# Patient Record
Sex: FEMALE | Race: WHITE | Employment: FULL TIME | URBAN - METROPOLITAN AREA
[De-identification: names, ages, dates, MRNs, and addresses within clinical notes are randomized per-mention and may not be internally consistent; named-entity substitution may affect disease eponyms.]

---

## 2018-08-23 ENCOUNTER — ACNE/ROSACEA (OUTPATIENT)
Dept: URBAN - METROPOLITAN AREA CLINIC 36 | Facility: CLINIC | Age: 28
Setting detail: DERMATOLOGY
End: 2018-08-23

## 2018-08-23 PROBLEM — D22.39 MELANOCYTIC NEVI OF OTHER PARTS OF FACE: Status: RESOLVED | Noted: 2018-08-23

## 2018-08-23 PROCEDURE — 99202 OFFICE O/P NEW SF 15 MIN: CPT

## 2018-08-23 RX ORDER — SPIRONOLACTONE 100 MG/1
1 TABLET TABLET, FILM COATED ORAL BID
Qty: 60 | Refills: 2
Start: 2018-08-23

## 2018-08-23 RX ORDER — CLINDAMYCIN PHOSPHATE AND TRETINOIN 12; .25 MG/G; MG/G
1 APPLICATION GEL TOPICAL NIGHTLY
Qty: 30 | Refills: 3
Start: 2018-08-23

## 2018-08-30 ENCOUNTER — ACNE SURGERY (OUTPATIENT)
Dept: URBAN - METROPOLITAN AREA CLINIC 36 | Facility: CLINIC | Age: 28
Setting detail: DERMATOLOGY
End: 2018-08-30

## 2018-08-30 PROCEDURE — 10040 ACNE SURGERY: CPT

## 2018-10-08 ENCOUNTER — FOLLOW UP (OUTPATIENT)
Dept: URBAN - METROPOLITAN AREA CLINIC 36 | Facility: CLINIC | Age: 28
Setting detail: DERMATOLOGY
End: 2018-10-08

## 2018-10-08 PROCEDURE — 99213 OFFICE O/P EST LOW 20 MIN: CPT

## 2018-10-19 ENCOUNTER — SEE NOTE (OUTPATIENT)
Dept: URBAN - METROPOLITAN AREA CLINIC 36 | Facility: CLINIC | Age: 28
Setting detail: DERMATOLOGY
End: 2018-10-19

## 2018-10-19 PROCEDURE — OTHER COSMETIC TREATMENT: OTHER

## 2019-01-14 ENCOUNTER — RX ONLY (RX ONLY)
Age: 29
End: 2019-01-14

## 2019-01-14 RX ORDER — SPIRONOLACTONE 100 MG/1
ADD'L SIG ADD'L SIG TABLET, FILM COATED ORAL ADD'L SIG
Qty: 60 | Refills: 2
Start: 2019-01-14

## 2019-01-16 ENCOUNTER — RX ONLY (RX ONLY)
Age: 29
End: 2019-01-16

## 2019-01-16 RX ORDER — SPIRONOLACTONE 100 MG/1
1 TABLET TABLET, FILM COATED ORAL BID
Qty: 180 | Refills: 0 | Status: DISCONTINUED
Start: 2019-01-16 | End: 2019-04-10

## 2019-02-05 ENCOUNTER — FRAXEL - FACE (BH) (OUTPATIENT)
Dept: URBAN - METROPOLITAN AREA CLINIC 31 | Facility: CLINIC | Age: 29
Setting detail: DERMATOLOGY
End: 2019-02-05

## 2019-02-05 DIAGNOSIS — E35 DISORDERS OF ENDOCRINE GLANDS IN DISEASES CLASSIFIED ELSEWHERE: ICD-10-CM

## 2019-02-05 PROCEDURE — OTHER FRAXEL: OTHER

## 2019-04-10 ENCOUNTER — RX ONLY (RX ONLY)
Age: 29
End: 2019-04-10

## 2019-04-10 RX ORDER — SPIRONOLACTONE 100 MG/1
1 TABLET TABLET, FILM COATED ORAL BID
Qty: 180 | Refills: 0
Start: 2019-04-10

## 2019-07-03 ENCOUNTER — RX ONLY (RX ONLY)
Age: 29
End: 2019-07-03

## 2019-07-03 RX ORDER — SPIRONOLACTONE 100 MG/1
ADD'L SIG ADD'L SIG TABLET, FILM COATED ORAL ADD'L SIG
Qty: 180 | Refills: 3
Start: 2019-07-03

## 2020-01-23 ENCOUNTER — SEE NOTE (OUTPATIENT)
Dept: URBAN - METROPOLITAN AREA CLINIC 31 | Facility: CLINIC | Age: 30
Setting detail: DERMATOLOGY
End: 2020-01-23

## 2020-01-23 PROBLEM — D22.39 MELANOCYTIC NEVI OF OTHER PARTS OF FACE: Status: RESOLVED | Noted: 2020-01-23

## 2020-01-23 PROCEDURE — 99213 OFFICE O/P EST LOW 20 MIN: CPT

## 2020-09-09 ENCOUNTER — RX ONLY (RX ONLY)
Age: 30
End: 2020-09-09

## 2020-09-09 RX ORDER — SPIRONOLACTONE 100 MG/1
ADD'L SIG TABLET TABLET, FILM COATED ORAL ADD'L SIG
Qty: 180 | Refills: 1
Start: 2020-09-09

## 2021-02-23 ENCOUNTER — OFFICE VISIT (OUTPATIENT)
Dept: URBAN - METROPOLITAN AREA CLINIC 98 | Facility: CLINIC | Age: 31
End: 2021-02-23
Payer: COMMERCIAL

## 2021-02-23 ENCOUNTER — DASHBOARD ENCOUNTERS (OUTPATIENT)
Age: 31
End: 2021-02-23

## 2021-02-23 VITALS
HEART RATE: 70 BPM | DIASTOLIC BLOOD PRESSURE: 80 MMHG | TEMPERATURE: 96.1 F | SYSTOLIC BLOOD PRESSURE: 115 MMHG | WEIGHT: 185.6 LBS | BODY MASS INDEX: 29.13 KG/M2 | HEIGHT: 67 IN

## 2021-02-23 DIAGNOSIS — K57.92 DIVERTICULITIS: ICD-10-CM

## 2021-02-23 DIAGNOSIS — K57.90 DIVERTICULOSIS: ICD-10-CM

## 2021-02-23 DIAGNOSIS — R10.13 EPIGASTRIC PAIN: ICD-10-CM

## 2021-02-23 PROBLEM — 397881000: Status: ACTIVE | Noted: 2021-02-23

## 2021-02-23 PROCEDURE — 99203 OFFICE O/P NEW LOW 30 MIN: CPT | Performed by: INTERNAL MEDICINE

## 2021-02-23 RX ORDER — METRONIDAZOLE 500 MG/1
TAKE 1 TABLET (500 MG) BY ORAL ROUTE 3 TIMES PER DAY FOR 7 DAYS TABLET ORAL
Qty: 21 | Refills: 0 | Status: ACTIVE | COMMUNITY
Start: 2017-09-19

## 2021-02-23 RX ORDER — CIPROFLOXACIN HYDROCHLORIDE 500 MG/1
TAKE 1 TABLET (500 MG) BY ORAL ROUTE 2 TIMES PER DAY FOR 7 DAYS TABLET, FILM COATED ORAL 2
Qty: 14 | Refills: 0 | Status: ACTIVE | COMMUNITY
Start: 2017-09-19

## 2021-02-23 RX ORDER — METFORMIN HYDROCHLORIDE 1000 MG/1
1 TABLET WITH A MEAL TABLET, FILM COATED ORAL ONCE A DAY
Status: ACTIVE | COMMUNITY

## 2021-02-23 RX ORDER — SPIRONOLACTONE 50 MG/1
1 TABLET TABLET, FILM COATED ORAL ONCE A DAY
Status: ACTIVE | COMMUNITY

## 2021-02-23 NOTE — HPI-OTHER HISTORIES
Recurrent diverticulitis Michael tic rupture, hospitalized [facility] ER 2/15/21 ultrasound and lab done passing firm balls per rectum no blood in stool

## 2021-02-24 PROBLEM — 307496006: Status: ACTIVE | Noted: 2021-02-23

## 2021-03-05 ENCOUNTER — OFFICE VISIT (OUTPATIENT)
Dept: URBAN - METROPOLITAN AREA SURGERY CENTER 18 | Facility: SURGERY CENTER | Age: 31
End: 2021-03-05
Payer: COMMERCIAL

## 2021-03-05 DIAGNOSIS — R10.30 ABDOMINAL PAIN OF UNKNOWN CAUSE: ICD-10-CM

## 2021-03-05 PROCEDURE — 45378 DIAGNOSTIC COLONOSCOPY: CPT | Performed by: INTERNAL MEDICINE

## 2021-03-05 PROCEDURE — G8907 PT DOC NO EVENTS ON DISCHARG: HCPCS | Performed by: INTERNAL MEDICINE

## 2021-03-05 RX ORDER — CIPROFLOXACIN HYDROCHLORIDE 500 MG/1
TAKE 1 TABLET (500 MG) BY ORAL ROUTE 2 TIMES PER DAY FOR 7 DAYS TABLET, FILM COATED ORAL 2
Qty: 14 | Refills: 0 | Status: ACTIVE | COMMUNITY
Start: 2017-09-19

## 2021-03-05 RX ORDER — SPIRONOLACTONE 50 MG/1
1 TABLET TABLET, FILM COATED ORAL ONCE A DAY
Status: ACTIVE | COMMUNITY

## 2021-03-05 RX ORDER — METFORMIN HYDROCHLORIDE 1000 MG/1
1 TABLET WITH A MEAL TABLET, FILM COATED ORAL ONCE A DAY
Status: ACTIVE | COMMUNITY

## 2021-03-05 RX ORDER — METRONIDAZOLE 500 MG/1
TAKE 1 TABLET (500 MG) BY ORAL ROUTE 3 TIMES PER DAY FOR 7 DAYS TABLET ORAL
Qty: 21 | Refills: 0 | Status: ACTIVE | COMMUNITY
Start: 2017-09-19